# Patient Record
Sex: MALE | Race: WHITE | URBAN - METROPOLITAN AREA
[De-identification: names, ages, dates, MRNs, and addresses within clinical notes are randomized per-mention and may not be internally consistent; named-entity substitution may affect disease eponyms.]

---

## 2020-10-21 ENCOUNTER — HISTORICAL (OUTPATIENT)
Dept: ADMINISTRATIVE | Facility: HOSPITAL | Age: 30
End: 2020-10-21

## 2020-10-21 LAB
BUN SERPL-MCNC: 16 MG/DL (ref 8.9–20.6)
CALCIUM SERPL-MCNC: 10.1 MG/DL (ref 8.4–10.2)
CHLORIDE SERPL-SCNC: 105 MMOL/L (ref 98–107)
CO2 SERPL-SCNC: 26 MMOL/L (ref 22–29)
CREAT SERPL-MCNC: 1.22 MG/DL (ref 0.73–1.18)
CREAT/UREA NIT SERPL: 13
GLUCOSE SERPL-MCNC: 115 MG/DL (ref 74–100)
POTASSIUM SERPL-SCNC: 4.1 MMOL/L (ref 3.5–5.1)
SODIUM SERPL-SCNC: 142 MMOL/L (ref 136–145)

## 2020-11-06 ENCOUNTER — HISTORICAL (OUTPATIENT)
Dept: ADMINISTRATIVE | Facility: HOSPITAL | Age: 30
End: 2020-11-06

## 2020-11-06 LAB
BUN SERPL-MCNC: 10 MG/DL (ref 8.9–20.6)
CALCIUM SERPL-MCNC: 9.7 MG/DL (ref 8.4–10.2)
CHLORIDE SERPL-SCNC: 104 MMOL/L (ref 98–107)
CO2 SERPL-SCNC: 24 MMOL/L (ref 22–29)
CREAT SERPL-MCNC: 0.79 MG/DL (ref 0.73–1.18)
CREAT/UREA NIT SERPL: 13
GLUCOSE SERPL-MCNC: 92 MG/DL (ref 74–100)
POTASSIUM SERPL-SCNC: 4.3 MMOL/L (ref 3.5–5.1)
SODIUM SERPL-SCNC: 138 MMOL/L (ref 136–145)

## 2020-12-06 ENCOUNTER — HOSPITAL ENCOUNTER (OUTPATIENT)
Dept: MEDSURG UNIT | Facility: HOSPITAL | Age: 30
End: 2020-12-08
Attending: SURGERY | Admitting: SURGERY

## 2020-12-06 LAB
ABS NEUT (OLG): 7.2 X10(3)/MCL (ref 2.1–9.2)
ALBUMIN SERPL-MCNC: 4.2 GM/DL (ref 3.5–5)
ALBUMIN/GLOB SERPL: 1.7 RATIO (ref 1.1–2)
ALP SERPL-CCNC: 49 UNIT/L (ref 40–150)
ALT SERPL-CCNC: 37 UNIT/L (ref 0–55)
AST SERPL-CCNC: 17 UNIT/L (ref 5–34)
BASOPHILS # BLD AUTO: 0.1 X10(3)/MCL (ref 0–0.2)
BASOPHILS NFR BLD AUTO: 1 %
BILIRUB SERPL-MCNC: 0.3 MG/DL
BILIRUBIN DIRECT+TOT PNL SERPL-MCNC: 0.1 MG/DL (ref 0–0.5)
BILIRUBIN DIRECT+TOT PNL SERPL-MCNC: 0.2 MG/DL (ref 0–0.8)
BUN SERPL-MCNC: 8 MG/DL (ref 8.9–20.6)
CALCIUM SERPL-MCNC: 9.4 MG/DL (ref 8.4–10.2)
CHLORIDE SERPL-SCNC: 110 MMOL/L (ref 98–107)
CO2 SERPL-SCNC: 23 MMOL/L (ref 22–29)
CREAT SERPL-MCNC: 0.83 MG/DL (ref 0.73–1.18)
EOSINOPHIL # BLD AUTO: 0.4 X10(3)/MCL (ref 0–0.9)
EOSINOPHIL NFR BLD AUTO: 4 %
ERYTHROCYTE [DISTWIDTH] IN BLOOD BY AUTOMATED COUNT: 13.1 % (ref 11.5–14.5)
GLOBULIN SER-MCNC: 2.5 GM/DL (ref 2.4–3.5)
GLUCOSE SERPL-MCNC: 103 MG/DL (ref 74–100)
HCT VFR BLD AUTO: 39.9 % (ref 40–51)
HGB BLD-MCNC: 13.1 GM/DL (ref 13.5–17.5)
IMM GRANULOCYTES # BLD AUTO: 0.03 10*3/UL
IMM GRANULOCYTES NFR BLD AUTO: 0 %
LIPASE SERPL-CCNC: 28 U/L
LYMPHOCYTES # BLD AUTO: 1.8 X10(3)/MCL (ref 0.6–4.6)
LYMPHOCYTES NFR BLD AUTO: 17 %
MCH RBC QN AUTO: 32.8 PG (ref 26–34)
MCHC RBC AUTO-ENTMCNC: 32.8 GM/DL (ref 31–37)
MCV RBC AUTO: 99.8 FL (ref 80–100)
MONOCYTES # BLD AUTO: 0.7 X10(3)/MCL (ref 0.1–1.3)
MONOCYTES NFR BLD AUTO: 7 %
NEUTROPHILS # BLD AUTO: 7.2 X10(3)/MCL (ref 2.1–9.2)
NEUTROPHILS NFR BLD AUTO: 71 %
PLATELET # BLD AUTO: 227 X10(3)/MCL (ref 130–400)
PMV BLD AUTO: 10.5 FL (ref 7.4–10.4)
POTASSIUM SERPL-SCNC: 4.7 MMOL/L (ref 3.5–5.1)
PROT SERPL-MCNC: 6.7 GM/DL (ref 6.4–8.3)
RBC # BLD AUTO: 4 X10(6)/MCL (ref 4.5–5.9)
SODIUM SERPL-SCNC: 142 MMOL/L (ref 136–145)
WBC # SPEC AUTO: 10.2 X10(3)/MCL (ref 4.5–11)

## 2020-12-07 LAB
APPEARANCE, UA: CLEAR
BACTERIA #/AREA URNS AUTO: ABNORMAL /HPF
BILIRUB UR QL STRIP: NEGATIVE
COLOR UR: NORMAL
GLUCOSE (UA): NEGATIVE
HGB UR QL STRIP: NEGATIVE
HYALINE CASTS #/AREA URNS LPF: ABNORMAL /LPF
KETONES UR QL STRIP: NEGATIVE
LEUKOCYTE ESTERASE UR QL STRIP: NEGATIVE
NITRITE UR QL STRIP: NEGATIVE
PH UR STRIP: 6 [PH] (ref 4.5–8)
PROT UR QL STRIP: NEGATIVE
RBC #/AREA URNS AUTO: ABNORMAL /HPF
SARS-COV-2 AG RESP QL IA.RAPID: NEGATIVE
SP GR UR STRIP: 1.02 (ref 1–1.03)
SQUAMOUS #/AREA URNS LPF: ABNORMAL /LPF
UROBILINOGEN UR STRIP-ACNC: NORMAL
WBC #/AREA URNS AUTO: ABNORMAL /HPF

## 2020-12-08 LAB
ABS NEUT (OLG): 10.05 X10(3)/MCL (ref 2.1–9.2)
ALBUMIN SERPL-MCNC: 3.9 GM/DL (ref 3.5–5)
ALBUMIN/GLOB SERPL: 1.4 RATIO (ref 1.1–2)
ALP SERPL-CCNC: 49 UNIT/L (ref 40–150)
ALT SERPL-CCNC: 81 UNIT/L (ref 0–55)
AST SERPL-CCNC: 46 UNIT/L (ref 5–34)
BASOPHILS # BLD AUTO: 0 X10(3)/MCL (ref 0–0.2)
BASOPHILS NFR BLD AUTO: 0 %
BILIRUB SERPL-MCNC: 0.3 MG/DL
BILIRUBIN DIRECT+TOT PNL SERPL-MCNC: 0.1 MG/DL (ref 0–0.5)
BILIRUBIN DIRECT+TOT PNL SERPL-MCNC: 0.2 MG/DL (ref 0–0.8)
BUN SERPL-MCNC: 7 MG/DL (ref 8.9–20.6)
CALCIUM SERPL-MCNC: 9.4 MG/DL (ref 8.4–10.2)
CHLORIDE SERPL-SCNC: 108 MMOL/L (ref 98–107)
CO2 SERPL-SCNC: 23 MMOL/L (ref 22–29)
CREAT SERPL-MCNC: 0.72 MG/DL (ref 0.73–1.18)
ERYTHROCYTE [DISTWIDTH] IN BLOOD BY AUTOMATED COUNT: 13.1 % (ref 11.5–14.5)
GLOBULIN SER-MCNC: 2.7 GM/DL (ref 2.4–3.5)
GLUCOSE SERPL-MCNC: 125 MG/DL (ref 74–100)
HCT VFR BLD AUTO: 41.1 % (ref 40–51)
HGB BLD-MCNC: 13.4 GM/DL (ref 13.5–17.5)
IMM GRANULOCYTES # BLD AUTO: 0.02 10*3/UL
IMM GRANULOCYTES NFR BLD AUTO: 0 %
LYMPHOCYTES # BLD AUTO: 0.6 X10(3)/MCL (ref 0.6–4.6)
LYMPHOCYTES NFR BLD AUTO: 6 %
MCH RBC QN AUTO: 32 PG (ref 26–34)
MCHC RBC AUTO-ENTMCNC: 32.6 GM/DL (ref 31–37)
MCV RBC AUTO: 98.1 FL (ref 80–100)
MONOCYTES # BLD AUTO: 0.2 X10(3)/MCL (ref 0.1–1.3)
MONOCYTES NFR BLD AUTO: 2 %
NEUTROPHILS # BLD AUTO: 10.05 X10(3)/MCL (ref 2.1–9.2)
NEUTROPHILS NFR BLD AUTO: 92 %
PLATELET # BLD AUTO: 254 X10(3)/MCL (ref 130–400)
PMV BLD AUTO: 11.2 FL (ref 7.4–10.4)
POTASSIUM SERPL-SCNC: 3.8 MMOL/L (ref 3.5–5.1)
PROT SERPL-MCNC: 6.6 GM/DL (ref 6.4–8.3)
RBC # BLD AUTO: 4.19 X10(6)/MCL (ref 4.5–5.9)
SODIUM SERPL-SCNC: 140 MMOL/L (ref 136–145)
WBC # SPEC AUTO: 10.9 X10(3)/MCL (ref 4.5–11)

## 2020-12-15 ENCOUNTER — HISTORICAL (OUTPATIENT)
Dept: ADMINISTRATIVE | Facility: HOSPITAL | Age: 30
End: 2020-12-15

## 2020-12-15 LAB
ALBUMIN SERPL-MCNC: 4.4 GM/DL (ref 3.5–5)
ALBUMIN/GLOB SERPL: 1.3 RATIO (ref 1.1–2)
ALP SERPL-CCNC: 56 UNIT/L (ref 40–150)
ALT SERPL-CCNC: 48 UNIT/L (ref 0–55)
AST SERPL-CCNC: 22 UNIT/L (ref 5–34)
BILIRUB SERPL-MCNC: 0.4 MG/DL
BILIRUBIN DIRECT+TOT PNL SERPL-MCNC: 0.2 MG/DL (ref 0–0.5)
BILIRUBIN DIRECT+TOT PNL SERPL-MCNC: 0.2 MG/DL (ref 0–0.8)
BUN SERPL-MCNC: 11 MG/DL (ref 8.9–20.6)
CALCIUM SERPL-MCNC: 10.2 MG/DL (ref 8.4–10.2)
CHLORIDE SERPL-SCNC: 102 MMOL/L (ref 98–107)
CO2 SERPL-SCNC: 26 MMOL/L (ref 22–29)
CREAT SERPL-MCNC: 0.75 MG/DL (ref 0.73–1.18)
GLOBULIN SER-MCNC: 3.3 GM/DL (ref 2.4–3.5)
GLUCOSE SERPL-MCNC: 99 MG/DL (ref 74–100)
POTASSIUM SERPL-SCNC: 4.2 MMOL/L (ref 3.5–5.1)
PROT SERPL-MCNC: 7.7 GM/DL (ref 6.4–8.3)
SODIUM SERPL-SCNC: 138 MMOL/L (ref 136–145)

## 2022-04-11 ENCOUNTER — HISTORICAL (OUTPATIENT)
Dept: ADMINISTRATIVE | Facility: HOSPITAL | Age: 32
End: 2022-04-11

## 2022-04-27 VITALS
BODY MASS INDEX: 37.32 KG/M2 | OXYGEN SATURATION: 99 % | SYSTOLIC BLOOD PRESSURE: 123 MMHG | WEIGHT: 266.56 LBS | HEIGHT: 71 IN | DIASTOLIC BLOOD PRESSURE: 84 MMHG

## 2022-05-04 NOTE — HISTORICAL [facility] CERNER
This is a historical note converted from Jennifer. Formatting and pictures may have been removed.  Please reference Jennifer for original formatting and attached multimedia. Chief Complaint  midline ABD/CP x 1 week. has been seen at [facility] for same this week. zofran and norco RX is not helping.  History of Present Illness  Grady Dunn is a 31 yo M with pmh of plaque psoriasis who presented to the ED today with a 1 and half week history of midepigastric pain. Pt stated that the pain began on Thanksgiving night as a slamming pain that?awoke him from his sleep. Pt presented to the ED twice for what he thought was chest pain and was discharged with pain medication after negative workup. Pt stated that the pain started while waking him from sleep but today started to affect him while awake. He describes the pain as sharp, constant, and radiated to RUQ.? Pt made himself vomiting in attempt to relieve his pain. Pt denies any changes in urinary or bowel habits. Pt has tried ibuprofen and antacid with no relief. Pt does not associate his pain with meals. ROS-: F/C, N, headaches, heartburn, SOB, CP, cough, diarrhea, constipation. + abdominal pain and anxiety  Physical Exam  Vitals & Measurements  T:?36.8? ?C (Temporal Artery)? HR:?71(Peripheral)? RR:?18? BP:?137/76? SpO2:?99%? WT:?122?kg? BMI:?37.65?  General: talkative, NAD  HEENT: NCAT  CV: RRR  Pulm: aerating well  GI: soft, nondistended, mildly tender to deep palpation in midepigastrium, negative murphys sign  Assessment/Plan  31 yo M with symptomatic cholelithiasis  ?  -Admit to surgery  -NPO  -Multimodal pain control  -Book/Consent for cholecystectomy  -Zosyn  -LR mIVF  ?  ?  Indra Adkins MD  [employer] General Surgery  PGY-1   Problem List/Past Medical History  Ongoing  Obesity  Historical  Cellulitis  Medications  Inpatient  No active inpatient medications  Home  Norco 5 mg-325 mg oral tablet, 1 tab(s), Oral, q4hr, PRN  Zofran ODT 8 mg oral tablet, disintegrating, 8  mg= 1 tab(s), Oral, TID, PRN  Allergies  codeine?(Unknown)  Social History  Abuse/Neglect  No, 12/06/2020  No, 12/04/2020  No, 12/01/2020  No, No, Yes, 10/21/2020  No, 10/15/2020  Alcohol  Never, 11/06/2020  Tobacco  10 or more cigarettes (1/2 pack or more)/day in last 30 days, No, 12/06/2020  10 or more cigarettes (1/2 pack or more)/day in last 30 days, No, 12/04/2020  10 or more cigarettes (1/2 pack or more)/day in last 30 days, Cigarettes, No, 12/01/2020  10 or more cigarettes (1/2 pack or more)/day in last 30 days, N/A, 11/06/2020  Immunizations  Vaccine Date Status   influenza virus vaccine, inactivated 10/17/2020 Given   Lab Results  no leukocytosis  normal bili  normal lft  normal lipase  Diagnostic Results  RUQ U/S: mildly distended gallbladder with stone at gallbladder neck.

## 2024-01-26 NOTE — HISTORICAL [facility] CERNER
Health Maintenance Due   Topic Date Due    COVID-19 Vaccine (1) Never done    Pneumococcal Vaccine 0-64 (1 of 2 - PCV) Never done    HPV Vaccine (2 - Male 3-dose series) 10/13/2014    DTaP/Tdap/Td Vaccine (7 - Td or Tdap) 06/17/2019    Influenza Vaccine (1) Never done       Patient is a video visit.    This is a historical note converted from Jennifer. Formatting and pictures may have been removed.  Please reference Jennifer for original formatting and attached multimedia. Operative Report  ?   Name: Grady Dunn  MRN: [id number]  Date of Service: 12/7/2020  ?   Service:[facility] surgery  ?   Attending: Dr. Elvira Araiza  ?  Resident?Surgeon: Jennifer Reed, HO3; Yaz Nicole HO5  ?  Preoperative Diagnosis: acute vs chronic cholecystitis  ?  Postoperative Diagnosis: acute on chronic cholecystitis  ?  Procedure: laparoscopic cholecystectomy with drain placement  ?  Anesthesia: general  ?  EBL: 30cc  ?  Specimens: gallbladder  ?  Drains/packing:RUQ NATHAN drain  ?  Implant/Devices: none  ?  Procedure in detail:  The patient was brought into the operating room and laid supine on the operating table.??The patient then underwent general endotracheal anesthesia per the anesthesia department. The patient tolerated this well.??The abdomen was prepped and draped in sterile fashion.??An audible surgical time out took place between anesthesia, nursing and surgery. There was appropriate antibiotic and DVT prophylaxis preop.?  ?  Access to the abdomen was gained?via Optical technique.??Pneumoperitoneum was established to 15 mmHg.??The abdomen was then inspected and there was no damage to bowel,?omentum,?or mesentery.??Two additional 5 and 12 mm trocars were inserted in the usual position under direct visualization.???A layer of omental fat was bluntly dissected free of the gallbladder.? Once the omentum and duodenum were free of the gallbladder, graspers were used to provide traction on the gallbladder.??1 grasper was used to grasp the fundus of the gallbladder and retract it cephalad.??The other grasper was placed on the gallbladder infundibulum was used to provide appropriate tension.??Gallbladder appeared edematous?with?diffuse inflammatory changes. ?There are multiple large?vascular structures?in the adjacent tissues.? We were able to  identify a large grouping of blood vessels at the midportion of the gallbladder.? The small arterial branches going directly into the gallbladder were clipped sequentially.? The right hepatic artery was identified and carefully preserved.? Were were not able to identify the cystic duct.? No bile tinged drainage was found.? We were unable to identify the cystic duct during the course of our dissection removing the gallbladder from the gallbladder fossa.? Once the gallbladder was dissected completely free from the fossa, the specimen was placed into an Endocatch bag and removed from the abdomen.? The gallbladder was examined on the back table.? It appeared to have a 2mm cystic duct with only minimal biliary drainage when pressure was applied.? A large stone was present in the fundus of the gallbladder.?  ?  The gallbladder fossa was checked for any bleeding: there was excellent?hemostasis.????Gallbladder fossa was then irrigated and the excess fluid was suctioned out.??No biliary leakage was identified.? A 19Fr NATHAN drain was inserted into the abdomen and sutured in place.?? ?No other?pathology was identified.??All ports were removed.??The skin was closed with Monocryl suture and then covered with Steri-Strips?and Band-Aids.? Patient tolerated the procedure well with no obvious complications and was transferred in good and stable condition to the PACU.  ?   was present for the critical portions of the case  ?  [facility], HO3  ?   I agree with resident documentation. I was physically present, supervised resident, ?and discussed plan of care.  no bile was seen during the course of the procedure. upon my back table examination of gallbladder, a punctate opening was created with a large amount of pressure applied externally. I believe this cystic duct was fibrotic and occluded.  ?